# Patient Record
Sex: MALE | URBAN - METROPOLITAN AREA
[De-identification: names, ages, dates, MRNs, and addresses within clinical notes are randomized per-mention and may not be internally consistent; named-entity substitution may affect disease eponyms.]

---

## 2023-11-18 ENCOUNTER — HOSPITAL ENCOUNTER (EMERGENCY)
Facility: HOSPITAL | Age: 38
Discharge: HOME | End: 2023-11-18

## 2023-11-18 VITALS
TEMPERATURE: 99 F | SYSTOLIC BLOOD PRESSURE: 160 MMHG | HEART RATE: 104 BPM | OXYGEN SATURATION: 92 % | BODY MASS INDEX: 29.62 KG/M2 | RESPIRATION RATE: 20 BRPM | DIASTOLIC BLOOD PRESSURE: 102 MMHG | HEIGHT: 69 IN | WEIGHT: 200 LBS

## 2023-11-18 DIAGNOSIS — R46.89 ARGUMENTATIVE BEHAVIOR: ICD-10-CM

## 2023-11-18 DIAGNOSIS — R46.89 AGGRESSIVE BEHAVIOR: ICD-10-CM

## 2023-11-18 DIAGNOSIS — K08.89 DENTALGIA: Primary | ICD-10-CM

## 2023-11-18 DIAGNOSIS — Z53.20 LEFT BEFORE TREATMENT COMPLETED: ICD-10-CM

## 2023-11-18 PROCEDURE — 99281 EMR DPT VST MAYX REQ PHY/QHP: CPT

## 2023-11-18 ASSESSMENT — PAIN - FUNCTIONAL ASSESSMENT: PAIN_FUNCTIONAL_ASSESSMENT: 0-10

## 2023-11-18 ASSESSMENT — PAIN SCALES - GENERAL: PAINLEVEL_OUTOF10: 9

## 2023-11-18 NOTE — ED PROVIDER NOTES
[facility] EMERGENCY DEPT  EMERGENCY DEPARTMENT HISTORY AND PHYSICAL EXAM      Date: 11/18/2023  Patient Name: Filomena Blanchadr  MRN: [id number]  [address] [facility] [address] [facility] 1985  Date of evaluation: 11/18/2023  Provider: FARZANEH Noonan NP   Note Started: 4:57 PM EST 11/18/23    HISTORY OF PRESENT ILLNESS     Chief Complaint   Patient presents with    Dental Pain       History Provided By: Patient    HPI: Filomena Blanchard is a 43 y.o. male with no significant or chronic past medical history and other history reviewed as listed below presents complaining of right rear dental pain at the site of a previously fractured tooth. Patient very hyperactive and rapid and pressured speech. Brings in prescription bottle with reportedly clindamycin capsules that he was prescribed a month or so ago when he first had the tooth pain but he did not get filled until a few days ago and has taken it for the past 3 days but the label is removed. He also has topical analgesia that he has been using as well. Dental appointment on the 27th of this month. Patient is hard to focus and became very argumentative with further questioning. HPI thus limited. PAST MEDICAL HISTORY   Past Medical History:  History reviewed. No pertinent past medical history. Past Surgical History:  History reviewed. No pertinent surgical history. Family History:  History reviewed. No pertinent family history. Social History:  Social History     Tobacco Use    Smoking status: Never    Smokeless tobacco: Never   Substance Use Topics    Alcohol use: Yes     Comment: socially    Drug use: Not Currently       Allergies: Allergies   Allergen Reactions    Tramadol Seizure     When pt was younger       PCP: No primary care provider on file. Current Meds:   No current facility-administered medications for this encounter. No current outpatient medications on file.        Social Determinants of Health:   Social Determinants of Health     Tobacco Use: Low Risk  (11/18/2023) Patient History     Smoking Tobacco Use: Never     Smokeless Tobacco Use: Never     Passive Exposure: Not on file   Alcohol Use: Not on file   Financial Resource Strain: Not on file   Food Insecurity: Not on file   Transportation Needs: Not on file   Physical Activity: Not on file   Stress: Not on file   Social Connections: Not on file   Intimate Partner Violence: Not on file   Depression: Not on file   Housing Stability: Not on file   Interpersonal Safety: Not on file   Utilities: Not on file       PHYSICAL EXAM   Physical Exam  Vitals and nursing note reviewed. Constitutional:       Appearance: He is normal weight. He is not toxic-appearing or diaphoretic. HENT:      Head: Normocephalic. Jaw: There is normal jaw occlusion. No tenderness or swelling. Mouth/Throat:      Lips: Pink. Mouth: Mucous membranes are moist.      Dentition: Dental tenderness present. No gingival swelling, dental caries, dental abscesses or gum lesions. Tongue: No lesions. Tongue does not deviate from midline. Palate: No mass and lesions. Pharynx: Oropharynx is clear. Uvula midline. Eyes:      Conjunctiva/sclera: Conjunctivae normal.   Cardiovascular:      Rate and Rhythm: Normal rate. Pulmonary:      Effort: Pulmonary effort is normal. No respiratory distress. Musculoskeletal:      Cervical back: Neck supple. Skin:     Findings: No erythema. Neurological:      Mental Status: He is alert and oriented to person, place, and time. Psychiatric:         Attention and Perception: He is inattentive. Mood and Affect: Mood is anxious. Affect is labile and angry. Speech: Speech is rapid and pressured. Behavior: Behavior is agitated and aggressive. SCREENINGS                  LAB, EKG AND DIAGNOSTIC RESULTS   Labs:  No results found for this or any previous visit (from the past 12 hour(s)).     EKG: Not Applicable    Radiologic Studies:  Non-plain film images such as CT,